# Patient Record
Sex: MALE | Race: WHITE
[De-identification: names, ages, dates, MRNs, and addresses within clinical notes are randomized per-mention and may not be internally consistent; named-entity substitution may affect disease eponyms.]

---

## 2017-01-03 NOTE — RS.OPPTDN
Subjective


Date of Note: 01/03/17


Visit #: 6


Date of Evaluation: 12/19/16


Payer Source: Insurance


Treatment Diagnosis: Neck and low back pain


Current Subjective/complaints:: Patient reports neck pain has worsened over the 

past few days.  Says that he has had burning to both arms yesterday all day.





Pain Assessment





- Pain Description


Pain Location: neck


Current Pain Intensity: "higher today"





- Treatment


Modality: Ultrasound


Parameters/Method Applied: 1.5 w/cm2 x 10 mins to bilateral UT continuous PRIOR 

to traction


Patient Position: Sitting





- Heat/Cryotherapy


Treatment: Hot Pack (cervical x 20 mins in sitting)





- Traction Treatment


Method: Mechanical, Intermittent, Cervical


Patient Position: Supine


Amount of Force Applied: 18-19


Hold Time: 30


Rest Time: 5


Duration of treatment: 20


Traction Treatment Comment: 2 steps





Interventions





- Exercise/Activities/Manual Therapy


Exercises/Activities: Independent with cervical ROM, Reviewed postural 

techniques and strengthening


Manual Therapy: NA


HOME EXERCISE PROGRAM: Gentle cervical AROM all directions as tolerated in PAIN 

FREE ROM,avoid excessive cervical extension.





- Charges


Total Direct Minutes: 10


Total Treatment Time: 50


Procedures billed for this date of service:: hp, mechanical traction, u/s


Assessment: Patient continues with mod to severe neck pain and radicular 

symptoms of burning to both arms.  He maintains mod increase in muscle tone 

throughout bilateral UT, MT, and LT.  Traction providing temporary relief at 

present.


Patient Education: Education of diagnosis, Body/Joint mechanics, Home Exercise 

Program, Home Safety, Activity Modification, Education of Plan of Care


Patient demonstrates compliance with HEP?: Yes





Short Term Goals


Goal #1: Pt independent and compliant with basic HEP.


Goal to be met by: 01/02/17


Progress towards Goal:: Met


Goal #2: Muscle tone in bilateral upper traps decreased to minimal.


Goal to be met by: 01/02/17


Progress towards Goal:: Progressing


Goal #3: Neck pain at rest <5/10.


Goal to be met by: 01/02/17


Progress towards Goal:: Partially Met


Goal #4: Bilateral SLR 40-45 degrees.


Goal to be met by: 01/10/17 (N/A)





Long Term Goals


Goal #1: Pt knows/able to continue HEP to maintain level of function at 

discharge.


Goal to be met by: 01/23/17


Progress towards goal: Partially Met


Goal #2: Neck Disability score improved to 12.


Goal to be met by: 01/23/17


Goal #3: Pt able to perform home and work activities with minimal neck pain.


Goal to be met by: 01/23/17


Progress towards goal: Progressing


Goal #4: Pt to demonstrate good postural awareness.


Goal to be met by: 01/23/17


Progress towards goal: Partially Met





Plan


PLAN OF CARE EXPIRES ON:: 01/23/17


ORDER # VISITS AND/OR THROUGH DATE: 01/23/17


PLAN: Continue Plan of Care

## 2017-01-05 NOTE — RS.OPPTDN
Subjective


Date of Note: 01/05/17


Visit #: 7


Date of Evaluation: 12/19/16


Payer Source: Insurance


Treatment Diagnosis: Neck and low back pain


Current Subjective/complaints:: Patient reports the neck is about the same,but 

less frequent pain into the UE's.





Pain Assessment





- Pain Description


Pain Location: neck


Pain Description: Dull, Aching


Current Pain Intensity: 5/10





- Heat/Cryotherapy


Treatment: Hot Pack (20 mins. prior to traction)





- Traction Treatment


Method: Mechanical, Intermittent, Cervical


Patient Position: Supine


Amount of Force Applied: 19-20#


Hold Time: 30 secs.


Rest Time: 5 secs.


Duration of treatment: 20 mins.





Interventions





- Exercise/Activities/Manual Therapy


Exercises/Activities: Independent with cervical ROM, Reviewed postural 

techniques and strengthening


Total minutes of Exercise: 0


Manual Therapy: NA


Total minutes of Manual Therapy: 0


HOME EXERCISE PROGRAM: Gentle cervical AROM all directions as tolerated in PAIN 

FREE ROM,avoid excessive cervical extension.





- Charges


Total Direct Minutes: 0


Total Treatment Time: 40


Procedures billed for this date of service:: hp,traction


Assessment: Patient has less radiculopathy in the UE's ,but cervical ROM and 

upper trap tone unchanged today.He is guarded with cervical motion,and reports 

the  prolonged positions with the neck in extension at work elevate his pain.


Patient Education: Body/Joint mechanics, Activity Modification, Education of 

Plan of Care


Patient demonstrates compliance with HEP?: Yes





Short Term Goals


Goal #1: Pt independent and compliant with basic HEP.


Goal to be met by: 01/02/17


Progress towards Goal:: Met


Goal #2: Muscle tone in bilateral upper traps decreased to minimal.


Goal to be met by: 01/02/17


Progress towards Goal:: No Change


Goal #3: Neck pain at rest <5/10.


Goal to be met by: 01/02/17 (5/10 today)


Progress towards Goal:: Partially Met


Goal #4: Bilateral SLR 40-45 degrees.


Goal to be met by: 01/10/17 (N/A)





Long Term Goals


Goal #1: Pt knows/able to continue HEP to maintain level of function at 

discharge.


Goal to be met by: 01/23/17


Progress towards goal: Partially Met


Goal #2: Neck Disability score improved to 12.


Goal to be met by: 01/23/17


Goal #3: Pt able to perform home and work activities with minimal neck pain.


Goal to be met by: 01/23/17


Progress towards goal: Progressing


Goal #4: Pt to demonstrate good postural awareness.


Goal to be met by: 01/23/17


Progress towards goal: Partially Met





Plan


PLAN OF CARE EXPIRES ON:: 01/23/17


ORDER # VISITS AND/OR THROUGH DATE: 01/23/17


PLAN: Continue Plan of Care

## 2017-01-09 NOTE — RS.OPPTDN
Subjective


Date of Note: 01/09/17


Visit #: 8


Date of Evaluation: 12/19/16


Payer Source: Insurance


Treatment Diagnosis: Neck and low back pain


Current Subjective/complaints:: Reports the neck pain does not vary much ,

comparing when he has to work or not work.He agrees the traction seems to be 

the most beneficial.When he exercises ,he reports he has constant grinding in 

his neck,regardless of which motion.





Pain Assessment





- Pain Description


Pain Location: neck


Pain Description: Dull, Aching


Current Pain Intensity: 5/10





- Heat/Cryotherapy


Treatment: Hot Pack (20 mins. prior to traction)





Interventions





- Exercise/Activities/Manual Therapy


Exercises/Activities: Independent with cervical ROM, Reviewed postural 

techniques and strengthening


Total minutes of Exercise: 0


Manual Therapy: NA


Total minutes of Manual Therapy: 0


HOME EXERCISE PROGRAM: Gentle cervical AROM all directions as tolerated in PAIN 

FREE ROM,avoid excessive cervical extension.





- Charges


Total Direct Minutes: 0


Total Treatment Time: 40


Procedures billed for this date of service:: hp,traction


Assessment: Patient reports the traction gives him temporary relief,but the 

duration of relief is unpredictable.we discussed initiating D/C plan as his 

rehab potential is limited,based upon results of previous sessions.


Patient Education: Education of diagnosis, Body/Joint mechanics, Home Exercise 

Program, Home Safety, Activity Modification, Education of Plan of Care


Patient demonstrates compliance with HEP?: Yes





Short Term Goals


Goal #1: Pt independent and compliant with basic HEP.


Goal to be met by: 01/02/17


Progress towards Goal:: Met


Goal #2: Muscle tone in bilateral upper traps decreased to minimal.


Goal to be met by: 01/02/17


Progress towards Goal:: No Change


Goal #3: Neck pain at rest <5/10.


Goal to be met by: 01/02/17 (5/10 today)


Progress towards Goal:: Partially Met





Long Term Goals


Goal #1: Pt knows/able to continue HEP to maintain level of function at 

discharge.


Goal to be met by: 01/23/17


Progress towards goal: Partially Met


Goal #2: Neck Disability score improved to 12.


Goal to be met by: 01/23/17


Goal #3: Pt able to perform home and work activities with minimal neck pain.


Goal to be met by: 01/23/17


Progress towards goal: Progressing


Goal #4: Pt to demonstrate good postural awareness.


Goal to be met by: 01/23/17


Progress towards goal: Partially Met





Plan


PLAN OF CARE EXPIRES ON:: 01/23/17


ORDER # VISITS AND/OR THROUGH DATE: 01/23/17


PLAN: Continue Plan of Care

## 2017-01-11 NOTE — RS.OPPTDN
Subjective


Date of Note: 01/11/17


Visit #: 9


Date of Evaluation: 12/19/16


Payer Source: Insurance


Treatment Diagnosis: Neck and low back pain


Current Subjective/complaints:: Patient reports very minimal temporary benefit 

from traction.  States today his is hurting mostly in his middle back.  States 

he had a bad day yesterday.  States he continues to hurt more after working a 

12 hour shift.





Pain Assessment





- Pain Description


Pain Location: neck


Pain Description: Dull, Aching


Current Pain Intensity: 5/10





- Heat/Cryotherapy


Treatment: Hot Pack (x 15 mins to cervical spine in sitting prior to mechanical 

traction )





- Traction Treatment


Method: Mechanical, Intermittent, Cervical


Patient Position: Supine


Amount of Force Applied: 20 lbs.


Hold Time: 30 sec


Rest Time: 5 sec


Duration of treatment: 20 mins


Traction Treatment Comment: no complaints or issues following traction treatment





Interventions





- Exercise/Activities/Manual Therapy


Exercises/Activities: Independent with cervical ROM, Patient given green and 

blue theraband for scapular retraction and bilateral shoulder extension for 

postural strengthening.  Also demonstrated and advised to perform HS stretching 

for low back.


Total minutes of Exercise: X 8 mins


Manual Therapy: NA


HOME EXERCISE PROGRAM: Gentle cervical AROM all directions as tolerated in PAIN 

FREE ROM,avoid excessive cervical extension.  Green and blue theraband for scap 

retraction and bilateral shoulder extension.





- Charges


Total Direct Minutes: 8 mins


Total Treatment Time: 43


Procedures billed for this date of service:: hp, traction, ex


Assessment: Patient wants to continue one more visit.  Demonstrates receptive 

attitude toward postural exercises.


Patient Education: Education of diagnosis, Body/Joint mechanics, Home Exercise 

Program, Activity Modification, Education of Plan of Care


Patient demonstrates compliance with HEP?: Yes (ROM excercises)





Short Term Goals


Goal #1: Pt independent and compliant with basic HEP.


Goal to be met by: 01/02/17


Progress towards Goal:: Met


Goal #2: Muscle tone in bilateral upper traps decreased to minimal.


Goal to be met by: 01/02/17


Progress towards Goal:: No Change


Goal #3: Neck pain at rest <5/10.


Goal to be met by: 01/02/17 (5/10 today)


Progress towards Goal:: Partially Met


Goal #4: Bilateral SLR 40-45 degrees.


Goal to be met by: 01/10/17 (N/A)


Comments:: HS stretching exercises given to address this





Long Term Goals


Goal #1: Pt knows/able to continue HEP to maintain level of function at 

discharge.


Goal to be met by: 01/23/17


Progress towards goal: Partially Met


Goal #2: Neck Disability score improved to 12.


Goal to be met by: 01/23/17


Goal #3: Pt able to perform home and work activities with minimal neck pain.


Goal to be met by: 01/23/17


Progress towards goal: Progressing


Goal #4: Pt to demonstrate good postural awareness.


Goal to be met by: 01/23/17


Progress towards goal: Partially Met





Plan


PLAN OF CARE EXPIRES ON:: 01/23/17


ORDER # VISITS AND/OR THROUGH DATE: 01/23/17


PLAN: Continue Plan of Care

## 2017-01-12 ENCOUNTER — HOSPITAL ENCOUNTER (OUTPATIENT)
Dept: HOSPITAL 58 - REHAB | Age: 62
LOS: 19 days | End: 2017-01-31
Attending: NURSE PRACTITIONER

## 2017-01-12 VITALS — BODY MASS INDEX: 24.3 KG/M2

## 2017-01-12 DIAGNOSIS — M48.02: ICD-10-CM

## 2017-01-12 DIAGNOSIS — M51.36: Primary | ICD-10-CM

## 2017-01-12 DIAGNOSIS — M50.320: ICD-10-CM

## 2017-01-12 NOTE — RS.OPPTDC
Date of Discharge: 01/12/17


Date of Evaluation: 12/19/16


Number of Visits: 10


Treatment Diagnosis: Neck and low back pain


Current Level of Function: Independent in community ,with chronic neck and back 

pain.


Current Complaints/Gains: Patient agrees with D/C today,still reports temporary 

relief.





Pain Assessment





- Pain Description


Pain Location: neck


Pain Description: Dull, Aching, Chronic


Current Pain Intensity: 5/10





Functional Outcome Measure


Neck Disability Index: 17





- G Codes & Severity Modifier


G Codes & Modifier: NA


Source of G Code score: NA





Observation





- Observation


Posture: Forward Head





Gait





- Gait Pattern


General Gait Pattern Observation: No Deviations/Normal





- Heat/Cryotherapy


Treatment: Hot Pack (20 mins. prior to traction)





Interventions





- Exercise/Activities/Manual Therapy


Exercises/Activities: Independent with cervical ROM, Patient given green and 

blue theraband for scapular retraction and bilateral shoulder extension for 

postural strengthening.  Also demonstrated and advised to perform HS stretching 

for low back.


Total minutes of Exercise: 0


Manual Therapy: NA


Total minutes of Manual Therapy: 0


HOME EXERCISE PROGRAM: Gentle cervical AROM all directions as tolerated in PAIN 

FREE ROM,avoid excessive cervical extension.  Green and blue theraband for scap 

retraction and bilateral shoulder extension.





- Charges


Total Direct Minutes: 0


Total Treatment Time: 40


Procedures billed for this date of service:: hp,traction





Assessment


Assessment: Patient is only getting temporary relief ,agrees with D/C plan 

today.He has good understanding of HEP ,encouraged to do them as tolerated in 

PAIN FREE ROM.





Short Term Goals


Goal #1: Pt independent and compliant with basic HEP.


Goal to be met by: 01/02/17


Progress towards Goal:: Met


Goal #2: Muscle tone in bilateral upper traps decreased to minimal.


Goal to be met by: 01/02/17


Progress towards Goal:: No Change


Goal #3: Neck pain at rest <5/10.


Goal to be met by: 01/02/17


Progress towards Goal:: No Change


Goal #4: Bilateral SLR 40-45 degrees.


Goal to be met by: 01/10/17





Long Term Goals


Goal #1: Pt knows/able to continue HEP to maintain level of function at 

discharge.


Goal to be met by: 01/23/17


Progress towards goal: Met


Goal #2: Neck Disability score improved to 12.


Goal to be met by: 01/23/17


Progress towards goal: No Change


Goal #3: Pt able to perform home and work activities with minimal neck pain.


Goal to be met by: 01/23/17


Progress towards goal: No Change


Goal #4: Pt to demonstrate good postural awareness.


Goal to be met by: 01/23/17


Progress towards goal: Partially Met





Plan


Reason for Discharge:: Lack of Progress

## 2018-02-18 ENCOUNTER — HOSPITAL ENCOUNTER (OUTPATIENT)
Dept: HOSPITAL 58 - AMBL | Age: 63
Discharge: ANOTHER HOSPITAL | End: 2018-02-18
Attending: INTERNAL MEDICINE

## 2018-02-18 VITALS — BODY MASS INDEX: 24.3 KG/M2

## 2018-02-18 DIAGNOSIS — I10: ICD-10-CM

## 2018-02-18 DIAGNOSIS — C34.90: ICD-10-CM

## 2018-02-18 DIAGNOSIS — R00.0: ICD-10-CM

## 2018-02-18 DIAGNOSIS — C14.0: ICD-10-CM

## 2018-02-18 DIAGNOSIS — Z79.899: ICD-10-CM

## 2018-02-18 DIAGNOSIS — R56.9: Primary | ICD-10-CM

## 2018-02-28 ENCOUNTER — HOSPITAL ENCOUNTER (OUTPATIENT)
Dept: HOSPITAL 58 - OUTPT | Age: 63
Discharge: HOME | End: 2018-02-28
Attending: FAMILY MEDICINE

## 2018-02-28 VITALS — BODY MASS INDEX: 24.3 KG/M2

## 2018-02-28 DIAGNOSIS — Z45.2: Primary | ICD-10-CM

## 2018-02-28 PROCEDURE — 96523 IRRIG DRUG DELIVERY DEVICE: CPT

## 2018-09-27 ENCOUNTER — HOSPITAL ENCOUNTER (OUTPATIENT)
Dept: HOSPITAL 58 - AMBL | Age: 63
Discharge: ANOTHER HOSPITAL | End: 2018-09-27
Attending: FAMILY MEDICINE

## 2018-09-27 VITALS — BODY MASS INDEX: 24.3 KG/M2

## 2018-09-27 DIAGNOSIS — R41.82: Primary | ICD-10-CM

## 2018-09-27 DIAGNOSIS — C71.9: ICD-10-CM

## 2018-09-27 DIAGNOSIS — R00.0: ICD-10-CM

## 2018-09-27 DIAGNOSIS — R40.4: ICD-10-CM

## 2018-09-27 DIAGNOSIS — R53.83: ICD-10-CM

## 2018-09-27 DIAGNOSIS — R53.1: ICD-10-CM

## 2018-09-27 DIAGNOSIS — R40.2421: ICD-10-CM
